# Patient Record
Sex: FEMALE | Race: WHITE | NOT HISPANIC OR LATINO | URBAN - METROPOLITAN AREA
[De-identification: names, ages, dates, MRNs, and addresses within clinical notes are randomized per-mention and may not be internally consistent; named-entity substitution may affect disease eponyms.]

---

## 2021-07-22 ENCOUNTER — WEB ENCOUNTER (OUTPATIENT)
Dept: URBAN - METROPOLITAN AREA CLINIC 118 | Facility: CLINIC | Age: 31
End: 2021-07-22

## 2021-08-12 ENCOUNTER — DASHBOARD ENCOUNTERS (OUTPATIENT)
Age: 31
End: 2021-08-12

## 2021-08-12 ENCOUNTER — OFFICE VISIT (OUTPATIENT)
Dept: URBAN - METROPOLITAN AREA CLINIC 118 | Facility: CLINIC | Age: 31
End: 2021-08-12
Payer: COMMERCIAL

## 2021-08-12 DIAGNOSIS — K91.5 POST-CHOLECYSTECTOMY SYNDROME: ICD-10-CM

## 2021-08-12 DIAGNOSIS — R15.2 DEFECATION URGENCY: ICD-10-CM

## 2021-08-12 DIAGNOSIS — R14.0 ABDOMINAL BLOATING: ICD-10-CM

## 2021-08-12 DIAGNOSIS — K21.00 GASTROESOPHAGEAL REFLUX DISEASE WITH ESOPHAGITIS WITHOUT HEMORRHAGE: ICD-10-CM

## 2021-08-12 PROCEDURE — 99204 OFFICE O/P NEW MOD 45 MIN: CPT | Performed by: INTERNAL MEDICINE

## 2021-08-12 PROCEDURE — 99254 IP/OBS CNSLTJ NEW/EST MOD 60: CPT | Performed by: INTERNAL MEDICINE

## 2021-08-12 RX ORDER — NORETHINDRONE
1 TABLET KIT ONCE A DAY
Status: ACTIVE | COMMUNITY

## 2021-08-12 RX ORDER — DEXTROAMPHETAMINE SACCHARATE, AMPHETAMINE ASPARTATE, DEXTROAMPHETAMINE SULFATE, AND AMPHETAMINE SULFATE 7.5; 7.5; 7.5; 7.5 MG/1; MG/1; MG/1; MG/1
1 TABLET TABLET ORAL TWICE A DAY
Status: ACTIVE | COMMUNITY

## 2021-08-12 RX ORDER — COLESTIPOL HYDROCHLORIDE 1 G/1
1 TABLETS TABLET, FILM COATED ORAL TWICE A DAY
Qty: 60 TABLET | Refills: 5 | OUTPATIENT
Start: 2021-08-12

## 2021-08-12 RX ORDER — ROSUVASTATIN CALCIUM 10 MG/1
1 TABLET TABLET, FILM COATED ORAL ONCE A DAY
Status: ACTIVE | COMMUNITY

## 2021-08-12 NOTE — HPI-TODAY'S VISIT:
The patient is referred by Dr. Berkowitz for abdominal pain and irregular bowel habits.  She thinks this all started with a cholecystectomy in 2015, for gallstones.  Ever since that time she has had intermittent abdominal pain that is diffuse in nature and associated with meals.  There is significant bloating and a sensation of excess gas and fluid production.  This can often, though not always, be associated with bowel movement urgency.  She will often have to run to the bathroom to have a loose or explosive bowel movement, but there is no blood in the stools.  There is an associated sense of nausea but rare emesis.  She denies any history of irritable bowel syndrome or food allergies.  None of the symptoms were present before the gallbladder surgery.  She has gained 30 pounds in the past year, but this was due to a significant ligament tear in her foot, and lack of exercise, and predated the GI symptoms.  She has not tried any over-the-counter or prescription medication for the symptoms.  She has never had an a lower endoscopy.  She does have chronic reflux disease that is very mild and will respond to Tums without dysphagia, odynophagia, or melena.  She has no family history of Crohn's disease, ulcerative colitis, or colon cancer.  She is not on any new medication prior to the onset of symptoms.  Note was sent.

## 2021-08-15 LAB
F001-IGE EGG WHITE: <0.1
F002-IGE MILK: 0.63
F003-IGE CODFISH: <0.1
F004-IGE WHEAT: <0.1
F008-IGE CORN: <0.1
F010-IGE SESAME SEED: <0.1
F013-IGE PEANUT: <0.1
F014-IGE SOYBEAN: <0.1
F024-IGE SHRIMP: 0.18
F207-IGE CLAM: <0.1
F256-IGE WALNUT: <0.1
F338-IGE SCALLOP: <0.1
Lab: (no result)

## 2021-10-05 ENCOUNTER — OFFICE VISIT (OUTPATIENT)
Dept: URBAN - METROPOLITAN AREA CLINIC 118 | Facility: CLINIC | Age: 31
End: 2021-10-05